# Patient Record
Sex: MALE | Race: WHITE
[De-identification: names, ages, dates, MRNs, and addresses within clinical notes are randomized per-mention and may not be internally consistent; named-entity substitution may affect disease eponyms.]

---

## 2018-12-16 ENCOUNTER — HOSPITAL ENCOUNTER (INPATIENT)
Dept: HOSPITAL 47 - EC | Age: 38
LOS: 4 days | Discharge: HOME | DRG: 337 | End: 2018-12-20
Payer: COMMERCIAL

## 2018-12-16 DIAGNOSIS — Z88.5: ICD-10-CM

## 2018-12-16 DIAGNOSIS — Z79.1: ICD-10-CM

## 2018-12-16 DIAGNOSIS — F17.200: ICD-10-CM

## 2018-12-16 DIAGNOSIS — K66.0: ICD-10-CM

## 2018-12-16 DIAGNOSIS — Z83.3: ICD-10-CM

## 2018-12-16 DIAGNOSIS — Z82.49: ICD-10-CM

## 2018-12-16 DIAGNOSIS — Z88.8: ICD-10-CM

## 2018-12-16 DIAGNOSIS — K35.33: Primary | ICD-10-CM

## 2018-12-16 LAB
ALBUMIN SERPL-MCNC: 4.2 G/DL (ref 3.5–5)
ALP SERPL-CCNC: 108 U/L (ref 38–126)
ALT SERPL-CCNC: 47 U/L (ref 21–72)
ANION GAP SERPL CALC-SCNC: 11 MMOL/L
AST SERPL-CCNC: 27 U/L (ref 17–59)
BASOPHILS # BLD AUTO: 0 K/UL (ref 0–0.2)
BASOPHILS NFR BLD AUTO: 0 %
BUN SERPL-SCNC: 10 MG/DL (ref 9–20)
CALCIUM SPEC-MCNC: 8.9 MG/DL (ref 8.4–10.2)
CHLORIDE SERPL-SCNC: 98 MMOL/L (ref 98–107)
CO2 SERPL-SCNC: 25 MMOL/L (ref 22–30)
EOSINOPHIL # BLD AUTO: 0.2 K/UL (ref 0–0.7)
EOSINOPHIL NFR BLD AUTO: 1 %
ERYTHROCYTE [DISTWIDTH] IN BLOOD BY AUTOMATED COUNT: 4.86 M/UL (ref 4.3–5.9)
ERYTHROCYTE [DISTWIDTH] IN BLOOD: 12.8 % (ref 11.5–15.5)
GLUCOSE SERPL-MCNC: 116 MG/DL (ref 74–99)
HCT VFR BLD AUTO: 43.5 % (ref 39–53)
HGB BLD-MCNC: 14.7 GM/DL (ref 13–17.5)
LIPASE SERPL-CCNC: 13 U/L (ref 23–300)
LYMPHOCYTES # SPEC AUTO: 1.3 K/UL (ref 1–4.8)
LYMPHOCYTES NFR SPEC AUTO: 7 %
MCH RBC QN AUTO: 30.1 PG (ref 25–35)
MCHC RBC AUTO-ENTMCNC: 33.7 G/DL (ref 31–37)
MCV RBC AUTO: 89.3 FL (ref 80–100)
MONOCYTES # BLD AUTO: 1.2 K/UL (ref 0–1)
MONOCYTES NFR BLD AUTO: 7 %
NEUTROPHILS # BLD AUTO: 14.6 K/UL (ref 1.3–7.7)
NEUTROPHILS NFR BLD AUTO: 84 %
PLATELET # BLD AUTO: 177 K/UL (ref 150–450)
PROT SERPL-MCNC: 7.8 G/DL (ref 6.3–8.2)
SODIUM SERPL-SCNC: 134 MMOL/L (ref 137–145)
WBC # BLD AUTO: 17.5 K/UL (ref 3.8–10.6)

## 2018-12-16 PROCEDURE — 88108 CYTOPATH CONCENTRATE TECH: CPT

## 2018-12-16 PROCEDURE — 96375 TX/PRO/DX INJ NEW DRUG ADDON: CPT

## 2018-12-16 PROCEDURE — 87040 BLOOD CULTURE FOR BACTERIA: CPT

## 2018-12-16 PROCEDURE — 87070 CULTURE OTHR SPECIMN AEROBIC: CPT

## 2018-12-16 PROCEDURE — 96374 THER/PROPH/DIAG INJ IV PUSH: CPT

## 2018-12-16 PROCEDURE — 88304 TISSUE EXAM BY PATHOLOGIST: CPT

## 2018-12-16 PROCEDURE — 83690 ASSAY OF LIPASE: CPT

## 2018-12-16 PROCEDURE — 85025 COMPLETE CBC W/AUTO DIFF WBC: CPT

## 2018-12-16 PROCEDURE — 87186 SC STD MICRODIL/AGAR DIL: CPT

## 2018-12-16 PROCEDURE — 80053 COMPREHEN METABOLIC PANEL: CPT

## 2018-12-16 PROCEDURE — 74177 CT ABD & PELVIS W/CONTRAST: CPT

## 2018-12-16 PROCEDURE — 36415 COLL VENOUS BLD VENIPUNCTURE: CPT

## 2018-12-16 PROCEDURE — 87077 CULTURE AEROBIC IDENTIFY: CPT

## 2018-12-16 PROCEDURE — 87075 CULTR BACTERIA EXCEPT BLOOD: CPT

## 2018-12-16 PROCEDURE — 96361 HYDRATE IV INFUSION ADD-ON: CPT

## 2018-12-16 PROCEDURE — 99285 EMERGENCY DEPT VISIT HI MDM: CPT

## 2018-12-16 PROCEDURE — 87205 SMEAR GRAM STAIN: CPT

## 2018-12-16 PROCEDURE — 88305 TISSUE EXAM BY PATHOLOGIST: CPT

## 2018-12-16 NOTE — ED
Abdominal Pain HPI





- General


Chief Complaint: Abdominal Pain


Stated Complaint: abdominal pain


Time Seen by Provider: 12/16/18 17:31


Source: patient


Mode of arrival: ambulatory


Limitations: no limitations





- History of Present Illness


Initial Comments: 


Patient is a 38-year-old male presents with chief complaint of right lower 

quadrant abdominal pain.  This is been gradually worsening for the last 3 days.

  The patient states that today he had some diarrhea, and a decreased appetite.

  He admits to subjective fever.  Cannot identify an inciting incident.  No 

aggravating or alleviating factors.  Timing is constant.








- Related Data


 Home Medications











 Medication  Instructions  Recorded  Confirmed


 


Ibuprofen [Advil] 400 mg PO Q8HR 12/16/18 12/16/18











 Allergies











Allergy/AdvReac Type Severity Reaction Status Date / Time


 


acetaminophen [From Vicodin] Allergy  Unknown Verified 12/16/18 17:42


 


hydrocodone [From Vicodin] Allergy  Unknown Verified 12/16/18 17:42


 


propoxyphene Allergy  Unknown Verified 12/16/18 17:42





[From Darvocet-N]     














Review of Systems


ROS Statement: 


Those systems with pertinent positive or pertinent negative responses have been 

documented in the HPI.





ROS Other: All systems not noted in ROS Statement are negative.


Constitutional: Reports: chills


Gastrointestinal: Reports: abdominal pain, diarrhea





Past Medical History


Past Medical History: No Reported History


History of Any Multi-Drug Resistant Organisms: None Reported


Past Surgical History: No Surgical Hx Reported


Past Psychological History: No Psychological Hx Reported


Smoking Status: Current every day smoker


Past Alcohol Use History: Occasional


Past Drug Use History: None Reported





General Exam


Limitations: no limitations


General appearance: alert, in no apparent distress


Head exam: Present: atraumatic, normocephalic


Eye exam: Present: normal appearance


ENT exam: Present: normal exam


Neck exam: Present: normal inspection


Respiratory exam: Present: normal lung sounds bilaterally.  Absent: respiratory 

distress, wheezes


Cardiovascular Exam: Present: regular rate, normal rhythm


GI/Abdominal exam: Present: soft, tenderness (RLQ tenderness at mcburneys point 

).  Absent: distended


Rectal exam: Present: deferred


Extremities exam: Present: normal inspection


Back exam: Present: normal inspection.  Absent: CVA tenderness (R), CVA 

tenderness (L)


Neurological exam: Present: alert, oriented X3


Psychiatric exam: Present: normal affect, normal mood


Skin exam: Present: warm, dry, intact





Course


 Vital Signs











  12/16/18 12/16/18 12/16/18





  17:27 18:58 19:12


 


Temperature 98.0 F  98.6 F


 


Pulse Rate 108 H 79 76


 


Respiratory 18 18 18





Rate   


 


Blood Pressure 119/79 114/71 118/70


 


O2 Sat by Pulse 97 97 97





Oximetry   














Medical Decision Making





- Medical Decision Making


Patient presents with a chief complaint of right lower quadrant abdominal pain.

  On initial evaluation, vital signs show mild tachycardia but are otherwise 

stable.  Patient be evaluated with basic labs including liver profile and 

lipase.  Patient with a computed tomography scan of the abdomen and pelvis with 

contrast to rule out appendicitis.  He was given Toradol and Zofran.





8:11 PM 


lab evaluation of this patient shows white blood cells of 17.5, but otherwise 

unremarkable.  Computed tomography scan of the abdomen and pelvis shows 

evidence of appendicitis with inflammatory changes around the cecum.  This case 

was discussed with Dr. Charles from radiology.  no free air identified.  Case 

discussed with Dr. Santos who recommends antibiotics and will take this 

patient to the OR tonight or tomorrow.  Results and I discussed with the 

patient and his wife.  They're agreeable.  Patient started on Rocephin and 

Flagyl.








- Lab Data


Result diagrams: 


 12/16/18 17:50





 12/16/18 17:50


 Lab Results











  12/16/18 12/16/18 Range/Units





  17:50 17:50 


 


WBC  17.5 H   (3.8-10.6)  k/uL


 


RBC  4.86   (4.30-5.90)  m/uL


 


Hgb  14.7   (13.0-17.5)  gm/dL


 


Hct  43.5   (39.0-53.0)  %


 


MCV  89.3   (80.0-100.0)  fL


 


MCH  30.1   (25.0-35.0)  pg


 


MCHC  33.7   (31.0-37.0)  g/dL


 


RDW  12.8   (11.5-15.5)  %


 


Plt Count  177   (150-450)  k/uL


 


Neutrophils %  84   %


 


Lymphocytes %  7   %


 


Monocytes %  7   %


 


Eosinophils %  1   %


 


Basophils %  0   %


 


Neutrophils #  14.6 H   (1.3-7.7)  k/uL


 


Lymphocytes #  1.3   (1.0-4.8)  k/uL


 


Monocytes #  1.2 H   (0-1.0)  k/uL


 


Eosinophils #  0.2   (0-0.7)  k/uL


 


Basophils #  0.0   (0-0.2)  k/uL


 


Sodium   134 L  (137-145)  mmol/L


 


Potassium     (3.5-5.1)  mmol/L


 


Chloride   98  ()  mmol/L


 


Carbon Dioxide   25  (22-30)  mmol/L


 


Anion Gap   11  mmol/L


 


BUN   10  (9-20)  mg/dL


 


Creatinine   0.96  (0.66-1.25)  mg/dL


 


Est GFR (CKD-EPI)AfAm   >90  (>60 ml/min/1.73 sqM)  


 


Est GFR (CKD-EPI)NonAf   >90  (>60 ml/min/1.73 sqM)  


 


Glucose   116 H  (74-99)  mg/dL


 


Calcium   8.9  (8.4-10.2)  mg/dL


 


Total Bilirubin   1.3  (0.2-1.3)  mg/dL


 


AST   27  (17-59)  U/L


 


ALT   47  (21-72)  U/L


 


Alkaline Phosphatase   108  ()  U/L


 


Total Protein   7.8  (6.3-8.2)  g/dL


 


Albumin   4.2  (3.5-5.0)  g/dL


 


Lipase   13 L  ()  U/L














Disposition


Clinical Impression: 


 Acute appendicitis, Leukocytosis





Disposition: ADMITTED AS IP TO THIS HOSP


Condition: Fair


Is patient prescribed a controlled substance at d/c from ED?: No


Referrals: 


None,Stated [Primary Care Provider] - 1-2 days


Decision to Admit Reason: Admit from EC





- Out of Hospital Transfer - Req. Specs


Out of Hospital Transfer - Requested Specifics: Other Non-Acute

## 2018-12-16 NOTE — CT
EXAMINATION TYPE: CT abdomen pelvis w con

 

DATE OF EXAM: 12/16/2018

 

COMPARISON: None

 

HISTORY: abdomen pain x several days

 

CT DLP: 1260.8 mGycm, Automated Exposure Control for Dose Reduction was Utilized.

 

CONTRAST: 

CT scan of the abdomen and pelvis is performed without oral but with IV Contrast, patient injected wi
th 100 mL of Isovue 300.

 

FINDINGS:

 

LUNG BASES: Bibasilar linear scarring and/or atelectasis is seen dependently.

 

LIVER/GB: Gallbladder is contracted.

 

PANCREAS:  No significant abnormality is seen.

 

SPLEEN:  No significant abnormality is seen.

 

ADRENALS:  No significant abnormality is seen.

 

KIDNEYS:  No significant abnormality is seen.

 

BOWEL: Evaluation of bowel is suboptimal secondary to lack of enteric contrast. Stomach is poorly dis
tended and thus suboptimally evaluated. Duodenal sweep is felt within normal limits. There is no susp
icious proximal small bowel dilatation. There is mild to moderate wall thickening are present in the 
distal ileum including terminal ileum with mild/moderate ill-defined fluid and fat stranding. There a
re some fluid-filled prominence of ileal loops in the right lower quadrant. Several air-fluid levels 
are seen. Small bowel loops are dilated to 3.4 cm. There appears to be appendicolith at base of appen
elsa seen best coronal image 47. Remainder of appendix is not well-visualized but there is poor defini
tion with ill-defined fat stranding at this level. No pneumoperitoneum is noted. No well-formed fluid
 collection or abscess is seen.

 

PROSTATE/SEMINAL VESICLES:  No gross abnormality seen.

 

LYMPH NODES:  No greater than 1cm abdominal or pelvic lymph nodes are appreciated.

 

OSSEOUS STRUCTURES: Subcentimeter sclerotic foci right sacrum coronal image 78 and right proximal fem
ur coronal image 61 are nonspecific favor benign bone islands.

 

OTHER: There is moderate amount of free fluid in the pelvis axial image 76.

 

IMPRESSION: CT findings are suggestive of a fairly moderate to severe acute appendicitis as there is 
obstructing appendicolith  present. There is poor visualization of the appendix pass base. There is f
elt to be reactive inflammatory change to adjacent small bowel loops in the right lower quadrant. No 
pneumoperitoneum clearly seen. No well-formed fluid collection or abscess clearly identified.

 

Critical results communicated to ordering ER physician via telephone at time of dictation.

## 2018-12-17 LAB
BASOPHILS # BLD AUTO: 0 K/UL (ref 0–0.2)
BASOPHILS NFR BLD AUTO: 0 %
EOSINOPHIL # BLD AUTO: 0.1 K/UL (ref 0–0.7)
EOSINOPHIL NFR BLD AUTO: 1 %
ERYTHROCYTE [DISTWIDTH] IN BLOOD BY AUTOMATED COUNT: 4.33 M/UL (ref 4.3–5.9)
ERYTHROCYTE [DISTWIDTH] IN BLOOD: 13 % (ref 11.5–15.5)
HCT VFR BLD AUTO: 39.3 % (ref 39–53)
HGB BLD-MCNC: 12.9 GM/DL (ref 13–17.5)
LYMPHOCYTES # SPEC AUTO: 1.2 K/UL (ref 1–4.8)
LYMPHOCYTES NFR SPEC AUTO: 9 %
MCH RBC QN AUTO: 29.8 PG (ref 25–35)
MCHC RBC AUTO-ENTMCNC: 32.8 G/DL (ref 31–37)
MCV RBC AUTO: 90.7 FL (ref 80–100)
MONOCYTES # BLD AUTO: 0.7 K/UL (ref 0–1)
MONOCYTES NFR BLD AUTO: 6 %
NEUTROPHILS # BLD AUTO: 10.7 K/UL (ref 1.3–7.7)
NEUTROPHILS NFR BLD AUTO: 83 %
PLATELET # BLD AUTO: 175 K/UL (ref 150–450)
WBC # BLD AUTO: 12.9 K/UL (ref 3.8–10.6)

## 2018-12-17 PROCEDURE — 0DTJ4ZZ RESECTION OF APPENDIX, PERCUTANEOUS ENDOSCOPIC APPROACH: ICD-10-PCS

## 2018-12-17 PROCEDURE — 0D9J4ZZ DRAINAGE OF APPENDIX, PERCUTANEOUS ENDOSCOPIC APPROACH: ICD-10-PCS

## 2018-12-17 PROCEDURE — 8E0W3CZ ROBOTIC ASSISTED PROCEDURE OF TRUNK REGION, PERCUTANEOUS APPROACH: ICD-10-PCS

## 2018-12-17 PROCEDURE — 0DNW4ZZ RELEASE PERITONEUM, PERCUTANEOUS ENDOSCOPIC APPROACH: ICD-10-PCS

## 2018-12-17 RX ADMIN — METRONIDAZOLE SCH MLS/HR: 500 INJECTION, SOLUTION INTRAVENOUS at 22:56

## 2018-12-17 RX ADMIN — MORPHINE SULFATE PRN MG: 4 INJECTION, SOLUTION INTRAMUSCULAR; INTRAVENOUS at 02:24

## 2018-12-17 RX ADMIN — MORPHINE SULFATE PRN MG: 4 INJECTION, SOLUTION INTRAMUSCULAR; INTRAVENOUS at 14:56

## 2018-12-17 RX ADMIN — MORPHINE SULFATE PRN MG: 4 INJECTION, SOLUTION INTRAMUSCULAR; INTRAVENOUS at 11:08

## 2018-12-17 RX ADMIN — MORPHINE SULFATE PRN MG: 4 INJECTION, SOLUTION INTRAMUSCULAR; INTRAVENOUS at 06:29

## 2018-12-17 NOTE — P.OP
Date of Procedure: 12/17/18


Description of Procedure: 








SURGEON:  SYDNIE SANTOS MD





Preoperative Diagnosis: 


1.  Right lower quadrant abdominal pain


2.  Acute appendicitis. 


3.  Abnormal computed tomography scan for appendicitis


4.  History of tobacco abuse





Postoperative Diagnosis: 


1.  Right lower quadrant abdominal pain


2.  Retrocecal acute appendicitis with rupture 


3.  Abnormal computed tomography scan for appendicitis


4.  History of tobacco abuse


5.  Right lower quadrant appendiceal abscess, 50 mL


6.  Small bowel obstruction





Procedure(s) Performed: 


Robotic-assisted daVinci Xi laparoscopic lysis of adhesions over 30 minutes


Robotic-assisted daVinci Xi laparoscopic appendectomy


Robotic-assisted daVinci Xi laparoscopic drainage of appendiceal abscess 50 mL


Placement of CARMEL drain #19 right lower quadrant





Anesthesia: GETA, local


Surgeon: Sydnie Santos


Estimated Blood Loss (ml): 50


Pathology: other (appendiceal abscess culture, appendix)


Condition: stable


Disposition: floor





Operative Findings: 


1.  Impending small bowel obstruction from right lower quadrant localized 

peritoneal abscess


2.  Extensive lysis of adhesions for drainage of right lower quadrant 

appendiceal abscess, 50 mL


3.  Perforation along proximal body of appendix


4.  No inguinal hernias identified


5.  CARMEL drain along right lower quadrant via left lower quadrant port site


6.  Two staple loads blue, 45 mm, fired via right upper quadrant port


7.  Console time 52 minutes





INDICATIONS: The patient is a 38-year-old male who presents with acute 

appendicitis.  Surgical intervention was described in detail.  Benefits and 

risks, including infection, open surgery, and possibility for additional 

surgery was discussed at length. Informed consent was obtained. All questions 

of the patient and family were answered.





DESCRIPTION:  The patient was transferred to the operating room and placed in 

supine position.  He had previously voided.  The abdomen was then prepped and 

draped in standard sterile fashion as Ioban was placed along the abdomen to 

minimize any contamination of skin floor. 





After a timeout protocol was performed, attention was then brought to the left 

upper quadrant whereby a 0 degree 5 mm laparoscopic trocar entry was performed. 

The abdominal cavity was entered and insufflated to 15 mmHg pressure, which he 

tolerated well. Diagnostic laparoscopy demonstrated no injury to bowel, viscera 

or mesentery.  The small bowel was dilated with localized inflammation of the 

right lower quadrant.





Next a robotic 12-mm trocar was placed along the right upper quadrant, 15-cm 

superior from the pelvis. A 8 mm port was placed along the left lower quadrant 

and another 8-mm port along the epigastrium.  Ports were placed 10 cm apart 

from each other including 15-20 cm away from the target anatomy of the right 

pelvis.  The patient was then placed in Trendelenburg position, at least 14 

and right side up at least 6.





The robotic da Royal XI system was primed and docked from the left side of the 

patient. 





Using atraumatic graspers and vessel sealer, the robotic system was docked and 

primed as described. Instruments were interchanged by the assistant including 

graspers, robotic stapler and vessel sealer. 





Next, attention was brought to identify the cecum.  





A systematic view within the abdominal cavity was started with the small bowel 

which was remarkable for dilation and inflammation involving the distal 

terminal ileum. The base of the cecum had inflammation.  The appendix was found 

retrocecally with moderate dissection performed to identify the base of the 

appendix.  A large periappendiceal right lower quadrant peritoneal abscess over 

50 mL was drained.  The entire appendix was necrotic with perforation along the 

midbody. 





A 45 mm blue robotic staple loads were fired along the base of the appendix. 

Bleeding along the staple line was controlled with pressure using 4 x 4 gauze.  

Hemostasis was checked prior to undocking the robot.





The robot was undocked.  I re-scrubbed into the case.  





A round #19 drain was placed via the left lower quadrant port and positioned at 

the abscess pocket after irrigating the abdomen with 1 L of normal saline until 

the aspirant was clear.  A drain stitch 2-0 nylon was placed with the bulb 

attached separately.





The specimen was removed from the abdominal cavity with an Endo Catch bag 

through the 12 mm trocar at the right upper quadrant.





All instruments and pneumoperitoneum were evacuated from the abdominal cavity. 





Local anesthetic was infiltrated to all wounds for postop analgesia.  All 

incisions were also cleansed with diluted hydrogen peroxide.  An Optifoam 

surgical dressing was placed over the right upper quadrant incision.





Exofin glue was applied to the rest of the skin incisions. 





The patient had tolerated the procedure well. The patient was extubated 

successfully. 





Intraoperative photos were reviewed with the patient's family who were overall 

pleased with the level of care. The patient was transferred to the 

postanesthesia care unit in stable condition.
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
## 2018-12-18 LAB
BASOPHILS # BLD AUTO: 0 K/UL (ref 0–0.2)
BASOPHILS NFR BLD AUTO: 0 %
EOSINOPHIL # BLD AUTO: 0 K/UL (ref 0–0.7)
EOSINOPHIL NFR BLD AUTO: 0 %
ERYTHROCYTE [DISTWIDTH] IN BLOOD BY AUTOMATED COUNT: 3.9 M/UL (ref 4.3–5.9)
ERYTHROCYTE [DISTWIDTH] IN BLOOD: 12.7 % (ref 11.5–15.5)
HCT VFR BLD AUTO: 35.6 % (ref 39–53)
HGB BLD-MCNC: 11.3 GM/DL (ref 13–17.5)
LYMPHOCYTES # SPEC AUTO: 1 K/UL (ref 1–4.8)
LYMPHOCYTES NFR SPEC AUTO: 9 %
MCH RBC QN AUTO: 29 PG (ref 25–35)
MCHC RBC AUTO-ENTMCNC: 31.8 G/DL (ref 31–37)
MCV RBC AUTO: 91.1 FL (ref 80–100)
MONOCYTES # BLD AUTO: 0.8 K/UL (ref 0–1)
MONOCYTES NFR BLD AUTO: 7 %
NEUTROPHILS # BLD AUTO: 9 K/UL (ref 1.3–7.7)
NEUTROPHILS NFR BLD AUTO: 81 %
PLATELET # BLD AUTO: 197 K/UL (ref 150–450)
WBC # BLD AUTO: 11.1 K/UL (ref 3.8–10.6)

## 2018-12-18 RX ADMIN — METRONIDAZOLE SCH MLS/HR: 500 INJECTION, SOLUTION INTRAVENOUS at 22:23

## 2018-12-18 RX ADMIN — KETOROLAC TROMETHAMINE SCH MG: 30 INJECTION, SOLUTION INTRAMUSCULAR at 13:22

## 2018-12-18 RX ADMIN — MORPHINE SULFATE PRN MG: 4 INJECTION, SOLUTION INTRAMUSCULAR; INTRAVENOUS at 07:13

## 2018-12-18 RX ADMIN — METRONIDAZOLE SCH MLS/HR: 500 INJECTION, SOLUTION INTRAVENOUS at 04:56

## 2018-12-18 RX ADMIN — CEFAZOLIN ONE MLS/HR: 330 INJECTION, POWDER, FOR SOLUTION INTRAMUSCULAR; INTRAVENOUS at 17:20

## 2018-12-18 RX ADMIN — ENOXAPARIN SODIUM SCH MG: 30 INJECTION SUBCUTANEOUS at 09:41

## 2018-12-18 RX ADMIN — MORPHINE SULFATE PRN MG: 4 INJECTION, SOLUTION INTRAMUSCULAR; INTRAVENOUS at 11:39

## 2018-12-18 RX ADMIN — AMPICILLIN SODIUM AND SULBACTAM SODIUM SCH MLS/HR: 2; 1 INJECTION, POWDER, FOR SOLUTION INTRAMUSCULAR; INTRAVENOUS at 06:14

## 2018-12-18 RX ADMIN — ACETAMINOPHEN AND CODEINE PHOSPHATE PRN EACH: 300; 30 TABLET ORAL at 18:25

## 2018-12-18 RX ADMIN — AMPICILLIN SODIUM AND SULBACTAM SODIUM SCH MLS/HR: 2; 1 INJECTION, POWDER, FOR SOLUTION INTRAMUSCULAR; INTRAVENOUS at 00:04

## 2018-12-18 RX ADMIN — CEFAZOLIN SCH MLS/HR: 330 INJECTION, POWDER, FOR SOLUTION INTRAMUSCULAR; INTRAVENOUS at 18:21

## 2018-12-18 RX ADMIN — CEFAZOLIN ONE MLS/HR: 330 INJECTION, POWDER, FOR SOLUTION INTRAMUSCULAR; INTRAVENOUS at 16:27

## 2018-12-18 RX ADMIN — KETOROLAC TROMETHAMINE SCH MG: 30 INJECTION, SOLUTION INTRAMUSCULAR at 19:30

## 2018-12-18 RX ADMIN — MORPHINE SULFATE PRN MG: 4 INJECTION, SOLUTION INTRAMUSCULAR; INTRAVENOUS at 01:34

## 2018-12-18 RX ADMIN — METRONIDAZOLE SCH MLS/HR: 500 INJECTION, SOLUTION INTRAVENOUS at 18:21

## 2018-12-18 RX ADMIN — AMPICILLIN SODIUM AND SULBACTAM SODIUM SCH MLS/HR: 2; 1 INJECTION, POWDER, FOR SOLUTION INTRAMUSCULAR; INTRAVENOUS at 13:23

## 2018-12-18 RX ADMIN — METRONIDAZOLE SCH MLS/HR: 500 INJECTION, SOLUTION INTRAVENOUS at 11:58

## 2018-12-18 RX ADMIN — AMPICILLIN SODIUM AND SULBACTAM SODIUM SCH MLS/HR: 2; 1 INJECTION, POWDER, FOR SOLUTION INTRAMUSCULAR; INTRAVENOUS at 19:31

## 2018-12-18 NOTE — P.PN
Subjective


Progress Note Date: 12/18/18

















CHIEF COMPLAINT:  Perforated appendicitis with large appendiceal abscess





HISTORY OF PRESENT ILLNESS: The patient is a 38-year-old gentleman status post 

robotic-assisted appendectomy for perforated appendicitis and drainage of large 

appendiceal abscess right lower quadrant.  He is tolerating diet.  He reports 

minimal flatus.  Pain is now controlled with IV Toradol and Tylenol No. 3.  

Cultures are pending.





PHYSICAL EXAM: 


VITAL SIGNS: Reviewed.


GENERAL: Well-developed in no acute distress. 


HEENT:  No sclera icterus. Extraocular movements grossly intact.  Moist buccal 

mucosa. 


Head is atraumatic, normocephalic. Hears conversational speech. No nasal 

drainage.


NECK:  Supple without lymphadenopathy.


CHEST:  Non-labored respirations and equal bilateral excursions. 


CARDIOVASCULAR:   Palpable 2+ radial pulses.


ABDOMEN:  Soft.  Incisions clean dry and intact.  CARMEL serosanguineous.  Minimal 

tenderness right lower quadrant.  No cell or some infection.


MUSCULOSKELETAL:  No clubbing, cyanosis or edema.


NEUROLOGIC:  No focal or lateralizing signs.  Cranial nerves II through XII 

grossly intact.


PSYCH:  Appropriate affect.  Alert and oriented to person, place and time.


SKIN: Well perfused.  Good skin turgor.





ASSESSMENT: 


1.  Acute perforated appendicitis with large appendiceal abscess





PLAN: 


1.  Continue IV antibiotics for large appendiceal abscess


2.  Infectious disease consultation for complicated perforated appendicitis





Objective





- Vital Signs


Vital signs: 


 Vital Signs











Temp  97.6 F   12/18/18 15:00


 


Pulse  78   12/18/18 15:00


 


Resp  17   12/18/18 15:00


 


BP  116/70   12/18/18 15:00


 


Pulse Ox  94 L  12/18/18 15:00








 Intake & Output











 12/17/18 12/18/18 12/18/18





 18:59 06:59 18:59


 


Intake Total 1200 1000 


 


Output Total 50 930 230


 


Balance 1150 70 -230


 


Intake:   


 


  IV 1200 0 


 


  Intake, IV Titration  650 





  Amount   


 


    Ampicillin-Sulbactam 3 gm  200 





    In Sodium Chloride 0.9%   





    100 ml @ 200 mls/hr IVPB   





    Q6HR [facility] Rx#:[id number]   


 


    Lactated Ringers 1,000 ml  250 





    @ 0 mls/hr IV .STK-MED   





    ONE Rx#:[id number]   


 


    metroNIDAZOLE-NS   200 





    mg In Saline 1 100ml.bag   





    @ 100 mls/hr IVPB Q6H [facility]   





    Rx#:[id number]   


 


  Oral  350 


 


Output:   


 


  Drainage  110 230


 


    Anterior Abdomen  110 230


 


  Urine  820 


 


  Estimated Blood Loss 50  


 


Other:   


 


  Voiding Method Toilet Toilet Toilet





  Urinal Urinal


 


  # Voids 2 2 2














- Labs


CBC & Chem 7: 


 12/18/18 11:24





 12/16/18 17:50


Labs: 


 Abnormal Lab Results - Last 24 Hours (Table)











  12/18/18 Range/Units





  11:24 


 


WBC  11.1 H  (3.8-10.6)  k/uL


 


RBC  3.90 L  (4.30-5.90)  m/uL


 


Hgb  11.3 L  (13.0-17.5)  gm/dL


 


Hct  35.6 L  (39.0-53.0)  %


 


Neutrophils #  9.0 H  (1.3-7.7)  k/uL








 Microbiology - Last 24 Hours (Table)











 12/17/18 18:59 Gram Stain - Preliminary





 Peritoneal Fluid Body Fluid Culture - Preliminary


 


 12/17/18 18:59 Anaerobic Culture - Preliminary





 Peritoneal Fluid 


 


 12/16/18 20:25 Blood Culture - Preliminary





 Blood    No Growth after 24 hours














Assessment and Plan


(1) Acute appendicitis


Current Visit: Yes   Status: Acute   Code(s): K35.80 - UNSPECIFIED ACUTE 

APPENDICITIS   SNOMED Code(s): 50650358


   





(2) Leukocytosis


Current Visit: Yes   Status: Acute   Code(s): D72.829 - ELEVATED WHITE BLOOD 

CELL COUNT, UNSPECIFIED   SNOMED Code(s): 966915960


   





(3) Peritonitis with abscess of intestine


Current Visit: Yes   Status: Acute   Code(s): K65.1 - PERITONEAL ABSCESS   

SNOMED Code(s): 68062405


   





(4) Ruptured suppurative appendicitis


Current Visit: Yes   Status: Acute   Code(s): K35.32 - ACUTE APPENDICITIS WITH 

PERF AND LOC PERITONITIS, W/O ABSCS   SNOMED Code(s): 09500589

## 2018-12-19 VITALS — RESPIRATION RATE: 16 BRPM

## 2018-12-19 LAB
BASOPHILS # BLD AUTO: 0.1 K/UL (ref 0–0.2)
BASOPHILS NFR BLD AUTO: 1 %
EOSINOPHIL # BLD AUTO: 0.2 K/UL (ref 0–0.7)
EOSINOPHIL NFR BLD AUTO: 2 %
ERYTHROCYTE [DISTWIDTH] IN BLOOD BY AUTOMATED COUNT: 4.2 M/UL (ref 4.3–5.9)
ERYTHROCYTE [DISTWIDTH] IN BLOOD: 12.8 % (ref 11.5–15.5)
HCT VFR BLD AUTO: 38.5 % (ref 39–53)
HGB BLD-MCNC: 12.1 GM/DL (ref 13–17.5)
LYMPHOCYTES # SPEC AUTO: 1.5 K/UL (ref 1–4.8)
LYMPHOCYTES NFR SPEC AUTO: 15 %
MCH RBC QN AUTO: 28.9 PG (ref 25–35)
MCHC RBC AUTO-ENTMCNC: 31.5 G/DL (ref 31–37)
MCV RBC AUTO: 91.7 FL (ref 80–100)
MONOCYTES # BLD AUTO: 0.5 K/UL (ref 0–1)
MONOCYTES NFR BLD AUTO: 5 %
NEUTROPHILS # BLD AUTO: 7.9 K/UL (ref 1.3–7.7)
NEUTROPHILS NFR BLD AUTO: 77 %
PLATELET # BLD AUTO: 228 K/UL (ref 150–450)
WBC # BLD AUTO: 10.3 K/UL (ref 3.8–10.6)

## 2018-12-19 RX ADMIN — ACETAMINOPHEN AND CODEINE PHOSPHATE PRN EACH: 300; 30 TABLET ORAL at 12:38

## 2018-12-19 RX ADMIN — CEFAZOLIN SCH: 330 INJECTION, POWDER, FOR SOLUTION INTRAMUSCULAR; INTRAVENOUS at 05:49

## 2018-12-19 RX ADMIN — MORPHINE SULFATE PRN MG: 4 INJECTION, SOLUTION INTRAMUSCULAR; INTRAVENOUS at 09:13

## 2018-12-19 RX ADMIN — AMPICILLIN SODIUM AND SULBACTAM SODIUM SCH MLS/HR: 2; 1 INJECTION, POWDER, FOR SOLUTION INTRAMUSCULAR; INTRAVENOUS at 00:28

## 2018-12-19 RX ADMIN — KETOROLAC TROMETHAMINE SCH MG: 30 INJECTION, SOLUTION INTRAMUSCULAR at 00:28

## 2018-12-19 RX ADMIN — AMPICILLIN SODIUM AND SULBACTAM SODIUM SCH MLS/HR: 2; 1 INJECTION, POWDER, FOR SOLUTION INTRAMUSCULAR; INTRAVENOUS at 05:51

## 2018-12-19 RX ADMIN — AMPICILLIN SODIUM AND SULBACTAM SODIUM SCH MLS/HR: 2; 1 INJECTION, POWDER, FOR SOLUTION INTRAMUSCULAR; INTRAVENOUS at 14:13

## 2018-12-19 RX ADMIN — KETOROLAC TROMETHAMINE SCH MG: 30 INJECTION, SOLUTION INTRAMUSCULAR at 12:40

## 2018-12-19 RX ADMIN — AMPICILLIN SODIUM AND SULBACTAM SODIUM SCH MLS/HR: 2; 1 INJECTION, POWDER, FOR SOLUTION INTRAMUSCULAR; INTRAVENOUS at 17:52

## 2018-12-19 RX ADMIN — METRONIDAZOLE SCH MLS/HR: 500 INJECTION, SOLUTION INTRAVENOUS at 22:58

## 2018-12-19 RX ADMIN — METRONIDAZOLE SCH MLS/HR: 500 INJECTION, SOLUTION INTRAVENOUS at 04:13

## 2018-12-19 RX ADMIN — KETOROLAC TROMETHAMINE SCH MG: 30 INJECTION, SOLUTION INTRAMUSCULAR at 22:57

## 2018-12-19 RX ADMIN — KETOROLAC TROMETHAMINE SCH MG: 30 INJECTION, SOLUTION INTRAMUSCULAR at 17:44

## 2018-12-19 RX ADMIN — MORPHINE SULFATE PRN MG: 4 INJECTION, SOLUTION INTRAMUSCULAR; INTRAVENOUS at 04:59

## 2018-12-19 RX ADMIN — ACETAMINOPHEN AND CODEINE PHOSPHATE PRN EACH: 300; 30 TABLET ORAL at 16:47

## 2018-12-19 RX ADMIN — KETOROLAC TROMETHAMINE SCH MG: 30 INJECTION, SOLUTION INTRAMUSCULAR at 05:51

## 2018-12-19 RX ADMIN — ENOXAPARIN SODIUM SCH MG: 30 INJECTION SUBCUTANEOUS at 07:53

## 2018-12-19 RX ADMIN — ACETAMINOPHEN AND CODEINE PHOSPHATE PRN EACH: 300; 30 TABLET ORAL at 07:53

## 2018-12-19 RX ADMIN — CEFAZOLIN SCH MLS/HR: 330 INJECTION, POWDER, FOR SOLUTION INTRAMUSCULAR; INTRAVENOUS at 12:44

## 2018-12-19 RX ADMIN — MORPHINE SULFATE PRN MG: 4 INJECTION, SOLUTION INTRAMUSCULAR; INTRAVENOUS at 14:14

## 2018-12-19 RX ADMIN — METRONIDAZOLE SCH MLS/HR: 500 INJECTION, SOLUTION INTRAVENOUS at 12:39

## 2018-12-19 RX ADMIN — METRONIDAZOLE SCH MLS/HR: 500 INJECTION, SOLUTION INTRAVENOUS at 16:42

## 2018-12-19 RX ADMIN — PIPERACILLIN AND TAZOBACTAM SCH MLS/HR: 3; .375 INJECTION, POWDER, FOR SOLUTION INTRAVENOUS at 22:58

## 2018-12-19 NOTE — P.PN
Subjective


Progress Note Date: 12/19/18

















CHIEF COMPLAINT:  Perforated appendicitis with large appendiceal abscess





HISTORY OF PRESENT ILLNESS: The patient is a 38-year-old gentleman status post 

robotic-assisted appendectomy for perforated appendicitis and drainage of large 

appendiceal abscess right lower quadrant.  He is tolerating diet.  He is now 

passing moderate flatus and has appetite. 





PHYSICAL EXAM: 


VITAL SIGNS: Reviewed.


GENERAL: Well-developed in no acute distress. 


HEENT:  No sclera icterus. Extraocular movements grossly intact.  Moist buccal 

mucosa. 


Head is atraumatic, normocephalic. Hears conversational speech. No nasal 

drainage.


NECK:  Supple without lymphadenopathy.


CHEST:  Non-labored respirations and equal bilateral excursions. 


CARDIOVASCULAR:   Palpable 2+ radial pulses.


ABDOMEN:  Soft.  Incisions clean dry and intact.  CARMEL serosanguineous.  No 

peritonitis.


MUSCULOSKELETAL:  No clubbing, cyanosis or edema.


NEUROLOGIC:  No focal or lateralizing signs.  Cranial nerves II through XII 

grossly intact.


PSYCH:  Appropriate affect.  Alert and oriented to person, place and time.


SKIN: Well perfused.  Good skin turgor.





ASSESSMENT: 


1.  Acute perforated appendicitis with large appendiceal abscess





PLAN: 


1.  Still awaiting final cultures


2.  Advance diet to soft


3.  For localized abscess with peritonitis, pending infectious disease 

recommendations. 


4.  Disposition in 24 hrs pending continued clinical improvement





Objective





- Vital Signs


Vital signs: 


 Vital Signs











Temp  98.3 F   12/19/18 19:10


 


Pulse  89   12/19/18 19:10


 


Resp  16   12/19/18 20:03


 


BP  116/70   12/19/18 19:10


 


Pulse Ox  94 L  12/19/18 19:10








 Intake & Output











 12/19/18 12/19/18 12/20/18





 06:59 18:59 06:59


 


Intake Total 9663 800 0812


 


Output Total 100 430 


 


Balance 2443 146 7864


 


Intake:   


 


  IV  400 


 


    Ampicillin-Sulbactam 3 gm  200 





    In Sodium Chloride 0.9%   





    100 ml @ 200 mls/hr IVPB   





    Q6HR [facility] Rx#:[id number]   


 


    metroNIDAZOLE-NS   200 





    mg In Saline 1 100ml.bag   





    @ 100 mls/hr IVPB Q6H [facility]   





    Rx#:[id number]   


 


  Intake, IV Titration 1045  150





  Amount   


 


    Ampicillin-Sulbactam 3 gm 200  





    In Sodium Chloride 0.9%   





    100 ml @ 200 mls/hr IVPB   





    Q6HR [facility] Rx#:[id number]   


 


    Sodium Chloride 0.9% 1, 645  150





    000 ml @ 75 mls/hr IV .   





    [id number] [facility] Rx#:[id number]   


 


    metroNIDAZOLE-NS  200  





    mg In Saline 1 100ml.bag   





    @ 100 mls/hr IVPB Q6H [facility]   





    Rx#:[id number]   


 


  Oral 


 


Output:   


 


  Drainage 100 430 


 


    Anterior Abdomen 100 430 


 


Other:   


 


  Voiding Method Toilet Toilet Toilet





 Urinal Urinal Urinal


 


  # Voids 2 1 














- Labs


CBC & Chem 7: 


 12/19/18 08:29





 12/16/18 17:50


Labs: 


 Abnormal Lab Results - Last 24 Hours (Table)











  12/19/18 Range/Units





  08:29 


 


RBC  4.20 L  (4.30-5.90)  m/uL


 


Hgb  12.1 L  (13.0-17.5)  gm/dL


 


Hct  38.5 L  (39.0-53.0)  %


 


Neutrophils #  7.9 H  (1.3-7.7)  k/uL








 Microbiology - Last 24 Hours (Table)











 12/17/18 18:59 Gram Stain - Preliminary





 Peritoneal Fluid Body Fluid Culture - Preliminary





    Escherichia coli





    Alpha Hemolytic Streptococcus


 


 12/16/18 20:25 Blood Culture - Preliminary





 Blood    No Growth after 48 hours














Assessment and Plan


(1) Acute appendicitis


Current Visit: Yes   Status: Acute   Code(s): K35.80 - UNSPECIFIED ACUTE 

APPENDICITIS   SNOMED Code(s): 12366971


   





(2) Leukocytosis


Current Visit: Yes   Status: Acute   Code(s): D72.829 - ELEVATED WHITE BLOOD 

CELL COUNT, UNSPECIFIED   SNOMED Code(s): 336121989


   





(3) Peritonitis with abscess of intestine


Current Visit: Yes   Status: Acute   Code(s): K65.1 - PERITONEAL ABSCESS   

SNOMED Code(s): 44399082


   





(4) Ruptured suppurative appendicitis


Current Visit: Yes   Status: Acute   Code(s): K35.32 - ACUTE APPENDICITIS WITH 

PERF AND LOC PERITONITIS, W/O ABSCS   SNOMED Code(s): 32807069

## 2018-12-20 VITALS — HEART RATE: 74 BPM | SYSTOLIC BLOOD PRESSURE: 107 MMHG | DIASTOLIC BLOOD PRESSURE: 69 MMHG | TEMPERATURE: 98 F

## 2018-12-20 LAB
BASOPHILS # BLD AUTO: 0 K/UL (ref 0–0.2)
BASOPHILS NFR BLD AUTO: 0 %
EOSINOPHIL # BLD AUTO: 0.4 K/UL (ref 0–0.7)
EOSINOPHIL NFR BLD AUTO: 5 %
ERYTHROCYTE [DISTWIDTH] IN BLOOD BY AUTOMATED COUNT: 3.94 M/UL (ref 4.3–5.9)
ERYTHROCYTE [DISTWIDTH] IN BLOOD: 13 % (ref 11.5–15.5)
HCT VFR BLD AUTO: 36.1 % (ref 39–53)
HGB BLD-MCNC: 11.9 GM/DL (ref 13–17.5)
LYMPHOCYTES # SPEC AUTO: 1.2 K/UL (ref 1–4.8)
LYMPHOCYTES NFR SPEC AUTO: 14 %
MCH RBC QN AUTO: 30.2 PG (ref 25–35)
MCHC RBC AUTO-ENTMCNC: 32.9 G/DL (ref 31–37)
MCV RBC AUTO: 91.6 FL (ref 80–100)
MONOCYTES # BLD AUTO: 0.5 K/UL (ref 0–1)
MONOCYTES NFR BLD AUTO: 6 %
NEUTROPHILS # BLD AUTO: 5.8 K/UL (ref 1.3–7.7)
NEUTROPHILS NFR BLD AUTO: 72 %
PLATELET # BLD AUTO: 221 K/UL (ref 150–450)
WBC # BLD AUTO: 8 K/UL (ref 3.8–10.6)

## 2018-12-20 RX ADMIN — CEFAZOLIN SCH MLS/HR: 330 INJECTION, POWDER, FOR SOLUTION INTRAMUSCULAR; INTRAVENOUS at 09:37

## 2018-12-20 RX ADMIN — METRONIDAZOLE SCH MLS/HR: 500 INJECTION, SOLUTION INTRAVENOUS at 09:37

## 2018-12-20 RX ADMIN — PIPERACILLIN AND TAZOBACTAM SCH MLS/HR: 3; .375 INJECTION, POWDER, FOR SOLUTION INTRAVENOUS at 12:09

## 2018-12-20 RX ADMIN — PIPERACILLIN AND TAZOBACTAM SCH MLS/HR: 3; .375 INJECTION, POWDER, FOR SOLUTION INTRAVENOUS at 05:23

## 2018-12-20 RX ADMIN — KETOROLAC TROMETHAMINE SCH MG: 30 INJECTION, SOLUTION INTRAMUSCULAR at 17:32

## 2018-12-20 RX ADMIN — ENOXAPARIN SODIUM SCH MG: 30 INJECTION SUBCUTANEOUS at 09:37

## 2018-12-20 RX ADMIN — KETOROLAC TROMETHAMINE SCH MG: 30 INJECTION, SOLUTION INTRAMUSCULAR at 05:19

## 2018-12-20 RX ADMIN — KETOROLAC TROMETHAMINE SCH MG: 30 INJECTION, SOLUTION INTRAMUSCULAR at 12:08

## 2018-12-20 RX ADMIN — ACETAMINOPHEN AND CODEINE PHOSPHATE PRN EACH: 300; 30 TABLET ORAL at 16:19

## 2018-12-20 RX ADMIN — METRONIDAZOLE SCH MLS/HR: 500 INJECTION, SOLUTION INTRAVENOUS at 17:32

## 2018-12-20 RX ADMIN — METRONIDAZOLE SCH MLS/HR: 500 INJECTION, SOLUTION INTRAVENOUS at 05:07
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
## 2018-12-20 NOTE — P.DS
Providers


Date of admission: 


12/17/18 19:26





Expected date of discharge: 12/20/18


Attending physician: 


Sydnie Santos





Consults: 





 





12/16/18 20:14


Consult Physician Routine 


   Consulting Provider: [facility]


   Consult Reason/Comments: Anesthesia Care


   Do you want consulting provider notified?: Yes





12/19/18 05:51


Consult Physician Routine 


   Consulting Provider: Shukri Paul


   Consult Reason/Comments: fecal peritonitis ruptured appendicitis antibiotic 

management


   Do you want consulting provider notified?: Yes, Notify in am











Primary care physician: 


Stated None








- Discharge Diagnosis(es)


(1) Acute appendicitis


Current Visit: Yes   Status: Acute   





(2) Leukocytosis


Current Visit: Yes   Status: Acute   





(3) Peritonitis with abscess of intestine


Current Visit: Yes   Status: Acute   





(4) Ruptured suppurative appendicitis


Current Visit: Yes   Status: Acute   


Hospital Course: 








 Vital Signs











Temp  98 F   12/20/18 15:35


 


Pulse  74   12/20/18 15:35


 


Resp  16   12/20/18 15:35


 


BP  107/69   12/20/18 15:35


 


Pulse Ox  95   12/20/18 15:35








 Intake & Output











 12/19/18 12/20/18 12/20/18





 18:59 06:59 18:59


 


Intake Total 880 1230 440


 


Output Total 430  


 


Balance 450 1230 440


 


Intake:   


 


    


 


    Ampicillin-Sulbactam 3 gm 200  





    In Sodium Chloride 0.9%   





    100 ml @ 200 mls/hr IVPB   





    Q6HR [facility] Rx#:[id number]   


 


    metroNIDAZOLE-NS  200  





    mg In Saline 1 100ml.bag   





    @ 100 mls/hr IVPB Q6H [facility]   





    Rx#:[id number]   


 


  Intake, IV Titration  150 





  Amount   


 


    Sodium Chloride 0.9% 1,  150 





    000 ml @ 75 mls/hr IV .   





    [id number] [facility] Rx#:[id number]   


 


  Oral 480 1080 440


 


Output:   


 


  Drainage 430  


 


    Anterior Abdomen 430  


 


Other:   


 


  Voiding Method Toilet Toilet 





 Urinal Urinal 


 


  # Voids 1  1











 Laboratory Results - last 24 hr











  12/20/18





  06:54


 


WBC  8.0


 


RBC  3.94 L


 


Hgb  11.9 L


 


Hct  36.1 L


 


MCV  91.6


 


MCH  30.2


 


MCHC  32.9


 


RDW  13.0


 


Plt Count  221


 


Neutrophils %  72


 


Lymphocytes %  14


 


Monocytes %  6


 


Eosinophils %  5


 


Basophils %  0


 


Neutrophils #  5.8


 


Lymphocytes #  1.2


 


Monocytes #  0.5


 


Eosinophils #  0.4


 


Basophils #  0.0








Patient clinically did well after ruptured appendicitis.  CARMEL drain care 

described.  Follow-up in the office for discontinue drain.  Antibiotic 

management confirmed by infectious disease provider


Patient Condition at Discharge: Fair





Plan - Discharge Summary


Discharge Rx Participant: Yes


New Discharge Prescriptions: 


New


   Ciprofloxacin HCl [Cipro] 500 mg PO Q12HR #20 tablet


   metroNIDAZOLE [Flagyl] 500 mg PO TID #30 tab


   Ibuprofen [Motrin] 600 mg PO Q8HR PRN #30 tab


     PRN Reason: Pain


   Acetaminophen-Codeine 300-30mg [Tylenol w/codeine #3] 1 each PO Q4HR PRN #18 

tab


     PRN Reason: Pain





Discontinued


   Ibuprofen [Advil] 400 mg PO Q8HR


Discharge Medication List





Acetaminophen-Codeine 300-30mg [Tylenol w/codeine #3] 1 each PO Q4HR PRN #18 

tab 12/20/18 [Rx]


Ciprofloxacin HCl [Cipro] 500 mg PO Q12HR #20 tablet 12/20/18 [Rx]


Ibuprofen [Motrin] 600 mg PO Q8HR PRN #30 tab 12/20/18 [Rx]


metroNIDAZOLE [Flagyl] 500 mg PO TID #30 tab 12/20/18 [Rx]








Follow up Appointment(s)/Referral(s): 


None,Stated [Primary Care Provider] - 1-2 days


Sydnie Santos MD [STAFF PHYSICIAN] - 01/02/19 (Call to confirm time of 

appointment)


Patient Instructions/Handouts:  Kun-[place]y Drain Care (DC), Laparoscopic 

Appendectomy (DC), Appendicitis (GEN)


Activity/Diet/Wound Care/Special Instructions: 


No bathtub soaks.  Do not use alcohol with prescriptions.  Please monitor and 

measure output from CARMEL drain.  Do not return to work until cleared by surgeon.  

No lifting over 4 pounds 2 weeks.


Discharge Disposition: HOME SELF-CARE